# Patient Record
Sex: FEMALE | Race: WHITE | NOT HISPANIC OR LATINO | Employment: FULL TIME | URBAN - METROPOLITAN AREA
[De-identification: names, ages, dates, MRNs, and addresses within clinical notes are randomized per-mention and may not be internally consistent; named-entity substitution may affect disease eponyms.]

---

## 2022-05-08 ENCOUNTER — HOSPITAL ENCOUNTER (EMERGENCY)
Facility: HOSPITAL | Age: 32
Discharge: HOME | End: 2022-05-08
Attending: EMERGENCY MEDICINE
Payer: COMMERCIAL

## 2022-05-08 VITALS
HEART RATE: 88 BPM | DIASTOLIC BLOOD PRESSURE: 63 MMHG | TEMPERATURE: 97.4 F | WEIGHT: 116 LBS | HEIGHT: 67 IN | SYSTOLIC BLOOD PRESSURE: 117 MMHG | BODY MASS INDEX: 18.21 KG/M2 | RESPIRATION RATE: 18 BRPM | OXYGEN SATURATION: 100 %

## 2022-05-08 DIAGNOSIS — M79.5 FOREIGN BODY (FB) IN SOFT TISSUE: Primary | ICD-10-CM

## 2022-05-08 PROCEDURE — 64450 NJX AA&/STRD OTHER PN/BRANCH: CPT | Performed by: EMERGENCY MEDICINE

## 2022-05-08 PROCEDURE — 99282 EMERGENCY DEPT VISIT SF MDM: CPT | Performed by: EMERGENCY MEDICINE

## 2022-05-08 PROCEDURE — 99283 EMERGENCY DEPT VISIT LOW MDM: CPT

## 2022-05-08 RX ORDER — LIDOCAINE HYDROCHLORIDE 10 MG/ML
10 INJECTION, SOLUTION EPIDURAL; INFILTRATION; INTRACAUDAL; PERINEURAL ONCE
Status: COMPLETED | OUTPATIENT
Start: 2022-05-08 | End: 2022-05-08

## 2022-05-08 RX ADMIN — LIDOCAINE HYDROCHLORIDE 10 ML: 10 INJECTION, SOLUTION EPIDURAL; INFILTRATION; INTRACAUDAL at 18:28

## 2022-05-08 NOTE — ED PROVIDER NOTES
History  Chief Complaint   Patient presents with    Foreign Body in Skin     patient has a thorn lodged under her right thumb  She went to urgent care and they weren't able to get it out  History provided by:  Patient  Foreign Body in Skin  Intake: Underneath left thumb nail  Suspected object: throne  Pain quality:  Aching  Duration:  2 hours  Timing:  Constant  Progression:  Unchanged  Chronicity:  New  Worsened by:  Nothing  Ineffective treatments: Went to urgent care they attempted removal which was unsuccessful, sent patient here  None       History reviewed  No pertinent past medical history  Past Surgical History:   Procedure Laterality Date    HYSTEROSCOPY         History reviewed  No pertinent family history  I have reviewed and agree with the history as documented  E-Cigarette/Vaping    E-Cigarette Use Never User      E-Cigarette/Vaping Substances     Social History     Tobacco Use    Smoking status: Never Smoker    Smokeless tobacco: Never Used   Vaping Use    Vaping Use: Never used   Substance Use Topics    Alcohol use: Never    Drug use: Never       Review of Systems   Constitutional: Negative for fever  Respiratory: Negative for chest tightness and shortness of breath  Cardiovascular: Negative for chest pain  Skin: Negative for rash  Neurological: Negative for dizziness, light-headedness and headaches  Physical Exam  Physical Exam  Vitals reviewed  Constitutional:       Appearance: She is well-developed  HENT:      Head: Atraumatic  Eyes:      General: No scleral icterus  Right eye: No discharge  Left eye: No discharge  Conjunctiva/sclera: Conjunctivae normal    Neck:      Trachea: No tracheal deviation  Pulmonary:      Effort: Pulmonary effort is normal  No respiratory distress  Breath sounds: No stridor  Musculoskeletal:         General: No deformity  Cervical back: Neck supple     Skin:     General: Skin is warm and dry       Coloration: Skin is not pale  Findings: No erythema or rash  Comments: Door and visualized under fingernail, left thumb   Neurological:      Mental Status: She is alert  Motor: No abnormal muscle tone  Coordination: Coordination normal          Vital Signs  ED Triage Vitals [05/08/22 1748]   Temperature Pulse Respirations Blood Pressure SpO2   (!) 97 4 °F (36 3 °C) 88 18 117/63 100 %      Temp Source Heart Rate Source Patient Position - Orthostatic VS BP Location FiO2 (%)   Oral Monitor Sitting Right arm --      Pain Score       No Pain           Vitals:    05/08/22 1748   BP: 117/63   Pulse: 88   Patient Position - Orthostatic VS: Sitting         Visual Acuity      ED Medications  Medications   lidocaine (PF) (XYLOCAINE-MPF) 1 % injection 10 mL (10 mL Infiltration Given by Other 5/8/22 1828)       Diagnostic Studies  Results Reviewed     None                 No orders to display              Procedures  Foreign Body - Embedded    Date/Time: 5/8/2022 6:48 PM  Performed by: Renetta Gamez DO  Authorized by: Renetta Gamez DO     Patient location:  ED  Consent:     Consent obtained:  Verbal    Consent given by:  Patient    Risks discussed:  Bleeding and incomplete removal    Alternatives discussed:  No treatment  Location:     Location:  Finger    Finger location:  L thumb    Depth:  Subungual (Underneath finger nail)    Tendon involvement:  None  Pre-procedure details:     Imaging:  None  Anesthesia (see MAR for exact dosages): Anesthesia method:  Nerve block    Block anesthetic:  Lidocaine 1% w/o epi    Block injection procedure:  Anatomic landmarks identified    Block outcome:  Anesthesia achieved  Procedure details:     Removal mechanism: Blunt dissection of the fingernail with blunt needle, slowly peeling off each layer, eventually visualized thorn which was able to be removed with a forceps      Removal Method:  Open    Foreign bodies recovered:  1    Description:  Intact regine  Post-procedure details:     Neurovascular status: intact      Confirmation:  No additional foreign bodies on visualization    Skin closure:  None    Dressing:  Adhesive bandage    Patient tolerance of procedure: Tolerated well, no immediate complications             ED Course                               SBIRT 20yo+      Most Recent Value   SBIRT (24 yo +)    In order to provide better care to our patients, we are screening all of our patients for alcohol and drug use  Would it be okay to ask you these screening questions? Yes Filed at: 05/08/2022 1827   Initial Alcohol Screen: US AUDIT-C     1  How often do you have a drink containing alcohol? 0 Filed at: 05/08/2022 1827   2  How many drinks containing alcohol do you have on a typical day you are drinking? 0 Filed at: 05/08/2022 1827   3b  FEMALE Any Age, or MALE 65+: How often do you have 4 or more drinks on one occassion? 0 Filed at: 05/08/2022 1827   Audit-C Score 0 Filed at: 05/08/2022 1827   EMY: How many times in the past year have you    Used an illegal drug or used a prescription medication for non-medical reasons?  Never Filed at: 05/08/2022 1827                    [facility]  Number of Diagnoses or Management Options  Foreign body (FB) in soft tissue: new and requires workup     Amount and/or Complexity of Data Reviewed  Review and summarize past medical records: yes        Disposition  Final diagnoses:   Foreign body (FB) in soft tissue - Under left fingernail     Time reflects when diagnosis was documented in both MDM as applicable and the Disposition within this note     Time User Action Codes Description Comment    5/8/2022  6:48 PM Terrilyn Band Add [V22 911T] Laceration of finger of left hand with foreign body and damage to nail     5/8/2022  6:49 PM Terrilyn Band Add [M79 5] Foreign body (FB) in soft tissue     5/8/2022  6:49 PM Terrilyn Band Modify [M79 5] Foreign body (FB) in soft tissue     5/8/2022  6:49 PM Terrilyn Band Remove [S61 329A] Laceration of finger of left hand with foreign body and damage to nail     5/8/2022  6:49 PM Suzanna Santa Modify [M79 5] Foreign body (FB) in soft tissue Under left fingernail      ED Disposition     ED Disposition Condition Date/Time Comment    Discharge Stable Sun May 8, 2022  6:48 PM Marv Garcia discharge to home/self care  Follow-up Information    None         There are no discharge medications for this patient  No discharge procedures on file      PDMP Review     None          ED Provider  Electronically Signed by           Cole Haskins DO  05/08/22 6567